# Patient Record
Sex: MALE | Race: WHITE | NOT HISPANIC OR LATINO | URBAN - METROPOLITAN AREA
[De-identification: names, ages, dates, MRNs, and addresses within clinical notes are randomized per-mention and may not be internally consistent; named-entity substitution may affect disease eponyms.]

---

## 2023-03-18 ENCOUNTER — EMERGENCY (EMERGENCY)
Facility: HOSPITAL | Age: 48
LOS: 1 days | Discharge: HOME | End: 2023-03-18
Admitting: EMERGENCY MEDICINE
Payer: MEDICAID

## 2023-03-18 VITALS
WEIGHT: 177.91 LBS | TEMPERATURE: 98 F | RESPIRATION RATE: 18 BRPM | OXYGEN SATURATION: 100 % | HEIGHT: 71 IN | DIASTOLIC BLOOD PRESSURE: 95 MMHG | SYSTOLIC BLOOD PRESSURE: 147 MMHG | HEART RATE: 100 BPM

## 2023-03-18 VITALS
HEART RATE: 87 BPM | DIASTOLIC BLOOD PRESSURE: 84 MMHG | OXYGEN SATURATION: 98 % | TEMPERATURE: 98 F | SYSTOLIC BLOOD PRESSURE: 147 MMHG | RESPIRATION RATE: 17 BRPM

## 2023-03-18 DIAGNOSIS — M54.2 CERVICALGIA: ICD-10-CM

## 2023-03-18 DIAGNOSIS — I73.9 PERIPHERAL VASCULAR DISEASE, UNSPECIFIED: ICD-10-CM

## 2023-03-18 DIAGNOSIS — Z87.891 PERSONAL HISTORY OF NICOTINE DEPENDENCE: ICD-10-CM

## 2023-03-18 DIAGNOSIS — M54.10 RADICULOPATHY, SITE UNSPECIFIED: ICD-10-CM

## 2023-03-18 DIAGNOSIS — R20.2 PARESTHESIA OF SKIN: ICD-10-CM

## 2023-03-18 PROCEDURE — 99284 EMERGENCY DEPT VISIT MOD MDM: CPT

## 2023-03-18 RX ORDER — METHOCARBAMOL 500 MG/1
2 TABLET, FILM COATED ORAL
Qty: 20 | Refills: 0
Start: 2023-03-18 | End: 2023-03-22

## 2023-03-18 RX ORDER — DEXAMETHASONE 0.5 MG/5ML
10 ELIXIR ORAL ONCE
Refills: 0 | Status: COMPLETED | OUTPATIENT
Start: 2023-03-18 | End: 2023-03-18

## 2023-03-18 RX ORDER — METHOCARBAMOL 500 MG/1
750 TABLET, FILM COATED ORAL ONCE
Refills: 0 | Status: COMPLETED | OUTPATIENT
Start: 2023-03-18 | End: 2023-03-18

## 2023-03-18 RX ADMIN — METHOCARBAMOL 750 MILLIGRAM(S): 500 TABLET, FILM COATED ORAL at 10:08

## 2023-03-18 RX ADMIN — Medication 10 MILLIGRAM(S): at 10:09

## 2023-03-18 NOTE — ED PROVIDER NOTE - OBJECTIVE STATEMENT
46yo M with h/o subclavian stenting for blockage on left presents with c/o right arm pain x 3 weeks. pt notes return trip to [facility] but states his symptoms started 1-2 weeks prior to that trip. he describes pain as starting in his left shoulder and radiating down his arm to his 4th and 5th digits, worse when he raises his arm above his head (notes he sleeps on his stomach with his arm raised above his head which replicates the symptoms). also notes pain and tingling is worse when he bends his elbows for prolonged time. 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 pt notes return trip to [facility] but states his symptoms started 1-2 weeks prior to that trip. he describes pain as starting in his left shoulder and radiating down his arm to his 4th and 5th digits, worse when he raises his arm above his head (notes he sleeps on his stomach with his arm raised above his head which replicates the symptoms) and also when he tilts his neck to the right. also notes pain and tingling is worse when he bends his elbows for prolonged time. denies chest pain, sob, swelling in any extremities, redness, warmth, blueness, coolness, headache, dizziness, nausea, vomiting, weakness, dropping things, pain with gripping or flexing, any other concerns. took 3 ibuprofen prior to arrival. notes no pain or symptoms with prior stenting - discovered blockage based on BP discrepancy.

## 2023-03-18 NOTE — ED ADULT NURSE NOTE - CHIEF COMPLAINT QUOTE
Pt walked in c/o of atraumatic right arm pain and numbness x 3 weeks. Pt reports a recent flight from [facility] on Wednesday. Denies right arm swelling, cp, or sob. PMH of HTN and HLD. PT reports recent left arm pain and was diagnosed with bicep tendonitis.

## 2023-03-18 NOTE — ED PROVIDER NOTE - PHYSICAL EXAMINATION
MSK: spine appears normal without midline tenderness or stepoff deformity. + radicular pain and tingling reproducible by raising his right arm, palpating along the inferior spine of the scapula, palpating along the ulnar tunnel of medial epicondyle of the elbow. decreased sensation to 5th digit and to lateral aspect of 4th digit in ulnar nerve distribution only. 2+ radial pulses equal bilat. normal Allens test. No swelling or redness. no bony tenderness.

## 2023-03-18 NOTE — ED PROVIDER NOTE - PATIENT PORTAL LINK FT
You can access the FollowMyHealth Patient Portal offered by [facility] by registering at the following website: [email or web address]. By joining [facility]’s FollowMyHealth portal, you will also be able to view your health information using other applications (apps) compatible with our system.

## 2023-03-18 NOTE — ED ADULT NURSE NOTE - NSIMPLEMENTINTERV_GEN_ALL_ED
Implemented All Universal Safety Interventions:  [place] to call system. Call bell, personal items and telephone within reach. Instruct patient to call for assistance. Room bathroom lighting operational. Non-slip footwear when patient is off stretcher. Physically safe environment: no spills, clutter or unnecessary equipment. Stretcher in lowest position, wheels locked, appropriate side rails in place.

## 2023-03-18 NOTE — ED PROVIDER NOTE - CLINICAL SUMMARY MEDICAL DECISION MAKING FREE TEXT BOX
pt presents with c/o radicular pain with tingling in ulnar distribution, reproducible with elevation of the right arm and with tilting neck to the right. no signs or symptoms of claudication or vascular compromise. no imaging indicated at this time.

## 2023-03-18 NOTE — ED PROVIDER NOTE - CARE PROVIDER_API CALL
Alfredo Duke)  [facility]  [address], [address] [place] [address]  Phone: [phone]  Fax: [phone]  Follow Up Time:

## 2023-03-18 NOTE — ED PROVIDER NOTE - NSFOLLOWUPINSTRUCTIONS_ED_ALL_ED_FT
KEEP YOUR APPOINTMENT WITH YOUR ORTHOPEDIST ON TUESDAY.     Cervical Radiculopathy    Close-up of the nerves of the cervical spine.   Cervical radiculopathy happens when a nerve in the neck (a cervical nerve) is pinched or bruised. This condition can happen because of an injury to the cervical spine (vertebrae) in the neck, or as part of the normal aging process. Pressure on the cervical nerves can cause pain or numbness that travels from the neck all the way down to the arm and fingers. This condition usually gets better with rest. Treatment may be needed if the condition does not improve.      What are the causes?    This condition may be caused by:  •A neck injury.      •A bulging (herniated) disk.      •Muscle spasms.      •Muscle tightness in the neck due to overuse.      •Arthritis.      •Breakdown or degeneration in the bones and joints of the spine (spondylosis) due to aging.      •Bone spurs that may develop near the cervical nerves.        What are the signs or symptoms?    Symptoms of this condition include:  •Pain. The pain may travel from the neck to the arm and hand. The pain can be severe or irritating. It may get worse when you move your neck.      •Numbness or tingling in your arm or hand.      •Weakness in the affected arm and hand, in severe cases.        How is this diagnosed?    This condition may be diagnosed based on your symptoms, your medical history, and a physical exam. You may also have tests, including:  •X-rays.      •CT scan.      •MRI.      •Electromyogram (EMG).      •Nerve conduction tests.        How is this treated?    In many cases, treatment is not needed for this condition. With rest, the condition usually gets better over time. If treatment is needed, options may include:  •Wearing a soft neck collar (cervical collar) for short periods of time.      •Doing physical therapy to strengthen your neck muscles.      •Taking medicines. These may include NSAIDs, such as ibuprofen, or oral corticosteroids.      •Having spinal injections, in severe cases.      •Having surgery. This may be needed if other treatments do not help. Different types of surgery may be done depending on the cause of this condition.        Follow these instructions at home:    If you have a cervical collar:     •Wear it as told by your health care provider. Remove it only as told by your health care provider.    •Ask your health care provider if you can remove the cervical collar for cleaning and bathing. If you are allowed to remove the collar for cleaning or bathing:  •Follow instructions from your health care provider about how to remove the collar safely.      •Clean the collar by wiping it with mild soap and water and drying it completely.      •Take out any removable pads in the collar every 1–2 days, and wash them by hand with soap and water. Let them air-dry completely before you put them back in the collar.      •Check your skin under the collar for irritation or sores. If you see any, tell your health care provider.          Managing pain       Bag of ice on a towel on the skin.        A heating pad for use on the painful area.     •Take over-the-counter and prescription medicines only as told by your health care provider.    •If directed, put ice on the affected area. To do this:  •If you have a soft neck collar, remove it as told by your health care provider.      •Put ice in a plastic bag.      •Place a towel between your skin and the bag.      •Leave the ice on for 20 minutes, 2–3 times a day.      •Remove the ice if your skin turns bright red. This is very important. If you cannot feel pain, heat, or cold, you have a greater risk of damage to the area.      •If applying ice does not help, you can try using heat. Use the heat source that your health care provider recommends, such as a moist heat pack or a heating pad.  •Place a towel between your skin and the heat source.      •Leave the heat on for 20–30 minutes.      •Remove the heat if your skin turns bright red. This is especially important if you are unable to feel pain, heat, or cold. You have a greater risk of getting burned.        •Try a gentle neck and shoulder massage to help relieve symptoms.      Activity     •Rest as needed.      •Return to your normal activities as told by your health care provider. Ask your health care provider what activities are safe for you.      •Do stretching and strengthening exercises as told by your health care provider or your physical therapist.      •You may have to avoid lifting. Ask your health care provider how much you can safely lift.      General instructions     •Use a flat pillow when you sleep.      • Do not drive while wearing a cervical collar. If you do not have a cervical collar, ask your health care provider if it is safe to drive while your neck heals.      •Ask your health care provider if the medicine prescribed to you requires you to avoid driving or using machinery.      • Do not use any products that contain nicotine or tobacco. These products include cigarettes, chewing tobacco, and vaping devices, such as e-cigarettes. If you need help quitting, ask your health care provider.      •Keep all follow-up visits. This is important.        Contact a health care provider if:    •Your condition does not improve with treatment.        Get help right away if:    •Your pain gets much worse and is not controlled with medicines.      •You have weakness or numbness in your hand, arm, face, or leg.      •You have a high fever.      •You have a stiff, rigid neck.      •You lose control of your bowels or your bladder (have incontinence).      •You have trouble with walking, balance, or speaking.        Summary    •Cervical radiculopathy happens when a nerve in the neck is pinched or bruised.      •A nerve can get pinched from a bulging disk, arthritis, muscle spasms, or an injury to the neck.      •Symptoms include pain, tingling, or numbness radiating from the neck to the arm or hand. Weakness can also occur in severe cases.      •Treatment may include rest, wearing a cervical collar, and physical therapy. Medicines may be prescribed to help with pain. In severe cases, injections or surgery may be needed.      This information is not intended to replace advice given to you by your health care provider. Make sure you discuss any questions you have with your health care provider.

## 2023-03-18 NOTE — ED ADULT TRIAGE NOTE - CHIEF COMPLAINT QUOTE
Pt walked in c/o of atraumatic right arm pain and numbness x 3 weeks. Pt reports a recent flight from [facility] on Wednesday. Denies right arm swelling, cp, or sob. PMH of HTN and HLD. PT reports recent left arm pain and was diagnosed with bicep tendonitis.